# Patient Record
Sex: FEMALE | Race: WHITE
[De-identification: names, ages, dates, MRNs, and addresses within clinical notes are randomized per-mention and may not be internally consistent; named-entity substitution may affect disease eponyms.]

---

## 2018-05-30 ENCOUNTER — HOSPITAL ENCOUNTER (EMERGENCY)
Dept: HOSPITAL 74 - JER | Age: 68
Discharge: HOME | End: 2018-05-30
Payer: COMMERCIAL

## 2018-05-30 VITALS — TEMPERATURE: 98.3 F | HEART RATE: 89 BPM | SYSTOLIC BLOOD PRESSURE: 136 MMHG | DIASTOLIC BLOOD PRESSURE: 78 MMHG

## 2018-05-30 VITALS — BODY MASS INDEX: 36.6 KG/M2

## 2018-05-30 DIAGNOSIS — E11.65: ICD-10-CM

## 2018-05-30 DIAGNOSIS — Z79.4: ICD-10-CM

## 2018-05-30 DIAGNOSIS — I25.10: Primary | ICD-10-CM

## 2018-05-30 LAB
ALBUMIN SERPL-MCNC: 3 G/DL (ref 3.4–5)
ALP SERPL-CCNC: 124 U/L (ref 45–117)
ALT SERPL-CCNC: 38 U/L (ref 12–78)
ANION GAP SERPL CALC-SCNC: 7 MMOL/L (ref 8–16)
AST SERPL-CCNC: 24 U/L (ref 15–37)
BASOPHILS # BLD: 0.5 % (ref 0–2)
BILIRUB SERPL-MCNC: 0.7 MG/DL (ref 0.2–1)
BUN SERPL-MCNC: 33 MG/DL (ref 7–18)
CALCIUM SERPL-MCNC: 8.7 MG/DL (ref 8.5–10.1)
CHLORIDE SERPL-SCNC: 97 MMOL/L (ref 98–107)
CO2 SERPL-SCNC: 28 MMOL/L (ref 21–32)
CREAT SERPL-MCNC: 1.5 MG/DL (ref 0.55–1.02)
DEPRECATED RDW RBC AUTO: 13 % (ref 11.6–15.6)
EOSINOPHIL # BLD: 2.5 % (ref 0–4.5)
GLUCOSE SERPL-MCNC: 545 MG/DL (ref 74–106)
HCT VFR BLD CALC: 40.4 % (ref 32.4–45.2)
HGB BLD-MCNC: 13.5 GM/DL (ref 10.7–15.3)
INR BLD: 0.99 (ref 0.82–1.09)
LYMPHOCYTES # BLD: 32.9 % (ref 8–40)
MCH RBC QN AUTO: 31.8 PG (ref 25.7–33.7)
MCHC RBC AUTO-ENTMCNC: 33.5 G/DL (ref 32–36)
MCV RBC: 95 FL (ref 80–96)
MONOCYTES # BLD AUTO: 7.7 % (ref 3.8–10.2)
NEUTROPHILS # BLD: 56.4 % (ref 42.8–82.8)
PLATELET # BLD AUTO: 135 K/MM3 (ref 134–434)
PMV BLD: 11.1 FL (ref 7.5–11.1)
POTASSIUM SERPLBLD-SCNC: 4.5 MMOL/L (ref 3.5–5.1)
PROT SERPL-MCNC: 6.9 G/DL (ref 6.4–8.2)
PT PNL PPP: 11.2 SEC (ref 9.7–13)
RBC # BLD AUTO: 4.25 M/MM3 (ref 3.6–5.2)
SODIUM SERPL-SCNC: 132 MMOL/L (ref 136–145)
WBC # BLD AUTO: 4.8 K/MM3 (ref 4–10)

## 2018-05-30 PROCEDURE — 3E033VG INTRODUCTION OF INSULIN INTO PERIPHERAL VEIN, PERCUTANEOUS APPROACH: ICD-10-PCS

## 2018-05-30 PROCEDURE — 3E0337Z INTRODUCTION OF ELECTROLYTIC AND WATER BALANCE SUBSTANCE INTO PERIPHERAL VEIN, PERCUTANEOUS APPROACH: ICD-10-PCS

## 2018-05-30 NOTE — EKG
Test Reason : 

Blood Pressure : ***/*** mmHG

Vent. Rate : 067 BPM     Atrial Rate : 067 BPM

   P-R Int : 206 ms          QRS Dur : 092 ms

    QT Int : 430 ms       P-R-T Axes : 040 038 029 degrees

   QTc Int : 454 ms

 

NORMAL SINUS RHYTHM

NORMAL ECG

NO PREVIOUS ECGS AVAILABLE

Confirmed by LAUREN FORD, CRISTOPHER ([id number]) on 5/30/2018 1:22:43 PM

 

Referred By:             Confirmed By:CRISTOPHER AGUILAR MD

## 2018-05-30 NOTE — PDOC
History of Present Illness





- General


History Source: Patient


Exam Limitations: No Limitations





- History of Present Illness


Initial Comments: 





05/30/18 01:24


The patient is a 67 year old female with past medical history of IDDM and CAD s/

p stenting x2 BIBA for high blood sugar. The patient states she has been in [place] 

[place] now for two months and is getting  this week. She states she 

recently ran out of insulin, stating her finger stick today was in the 500s. 

She reports that when her sugar is high she gets a tightness in her chest and 

mild shortness of breath which she has presently. She also notes she has 

chronic leg swelling, left worse than right. She denies any palpitations, 

diaphoresis, cough, headache, or focal neurological deficits. She denies any 

recent illness, fevers, or chills. Denies any nausea, vomiting, diarrhea, or 

urinary complaints. 





<Sarah Thomas - Last Filed: 05/30/18 01:24>





<Noemi Baker - Last Filed: 06/04/18 02:27>





- General


Chief Complaint: Blood Sugar Problem


Stated Complaint: BLOOD SUGAR PROBLEM


Time Seen by Provider: 05/30/18 01:03





Past History





<Sarah Thomas - Last Filed: 05/30/18 01:24>





<Noemi Baker - Last Filed: 06/04/18 02:27>





- Past Medical History


Allergies/Adverse Reactions: 


 Allergies











Allergy/AdvReac Type Severity Reaction Status Date / Time


 


No Allergy Information Allergy   Verified 05/30/18 01:13





Available     











Home Medications: 


Ambulatory Orders





Allopurinol [Zyloprim -] 100 mg PO DAILY 05/30/18 


Bumetanide 1 mg PO DAILY 05/30/18 


Calcium Carb/Magnesium Hydrox [Antacid Chewable Tablet] 1 each PO DAILY 05/30/ 18 


Clopidogrel Bisulfate [Clopidogrel] 75 mg PO HS 05/30/18 


Gabapentin 800 mg PO BID 05/30/18 


Hydroxyzine HCl 25 mg PO HS 05/30/18 


Insulin Lispro [Humalog Kwikpen U-100] 1 unit SQ AC #10 [email or web address] 05/30/18 


Insulin NPH Hum/Reg Insulin Hm [Humulin 70/30 Kwikpen] 50 unit SQ BID 05/30/18 


Insulin NPH Hum/Reg Insulin Hm [Humulin 70/30 Kwikpen] 50 unit SQ BID #30 

[email or web address] 05/30/18 


Lactulose 10 gm PO TID 05/30/18 


Pramipexole Di-HCl [Mirapex] 0.5 mg PO BID 05/30/18 


Propranolol HCl 80 mg PO BID 05/30/18 


Spironolactone 25 mg PO DAILY 05/30/18 


Tramadol HCl 50 mg PO QID 05/30/18 











**Review of Systems





- Review of Systems


Able to Perform ROS?: Yes


Comments:: 





05/30/18 01:28


CONSTITUTIONAL: 


Absent: fever, chills, diaphoresis, generalized weakness, malaise, loss of 

appetite


HEENT: 


Absent: rhinorrhea, nasal congestion, throat pain, throat swelling, difficulty 

swallowing,


mouth swelling, ear pain, eye pain, visual Changes


CARDIOVASCULAR: (+) chest tightness, peripheral edema 


Absent: syncope, palpitations, irregular heart rate, lightheadedness,


RESPIRATORY: (+) shortness of breath 


Absent: cough, dyspnea with exertion, orthopnea, wheezing, stridor,


hemoptysis


GASTROINTESTINAL:


Absent: abdominal pain, abdominal distension, nausea, vomiting, diarrhea, 

constipation,


melena, hematochezia


GENITOURINARY: 


Absent: dysuria, frequency, urgency, hesitancy, hematuria, flank pain, genital 

pain


MUSCULOSKELETAL: 


Absent: myalgia, arthralgia, joint swelling


SKIN:


Absent: rash, itching, pallor


HEMATOLOGIC/IMMUNOLOGIC: 


Absent: easy bleeding, easy bruising, lymphadenopathy, frequent infections


ENDOCRINE:


Absent: unexplained weight gain, unexplained weight loss, heat intolerance, 

cold intolerance


NEUROLOGIC: 


Absent: headache, focal weakness or paresthesias, dizziness, unsteady gait, 

seizure, mental


status changes, bladder or bowel incontinence


PSYCHIATRIC: 


Absent: anxiety, depression, suicidal or homicidal ideation, hallucinations.





All Other Systems: Reviewed and Negative





<Sarah Thomas - Last Filed: 05/30/18 01:24>





*Physical Exam





- Vital Signs


 Last Vital Signs











Temp Pulse Resp BP Pulse Ox


 


 98.2 F   98 H     144/72    


 


 05/30/18 00:40  05/30/18 00:40     05/30/18 00:40   














- Physical Exam


Comments: 





05/30/18 01:31


GENERAL:


Well developed, well nourished. Awake and alert. No acute distress.


HEENT:


Normocephalic, atraumatic. PERRLA, EOMI. No conjunctival pallor. Sclera are non-

icteric.


Moist mucous membranes. Oropharynx is clear.


NECK: 


Supple. Full ROM. No JVD. Carotid pulses 2+ and symmetric, without bruits. No


thyromegaly. No lymphadenopathy.


CARDIOVASCULAR:


Regular rate and rhythm. No murmurs, rubs, or gallops. Distal pulses are 2+ and


symmetric. 


PULMONARY: 


No evidence of respiratory distress. Lungs clear to auscultation bilaterally. 

No wheezing,


rales or rhonchi.


ABDOMINAL:


Soft. Non-tender. Protuberant. No rebound or guarding. No organomegaly. 

Normoactive


bowel sounds. 


MUSCULOSKELETAL 


Normal range of motion at all joints. No bony deformities or tenderness. No CVA


tenderness.


EXTREMITIES: 


Chronic venous stasis on bilateral lower extremities, left worse than right. No 

cyanosis. No clubbing.No calf tenderness.


SKIN: 


Warm and dry. Normal capillary refill. No rashes. No jaundice. 


NEUROLOGICAL: 


Alert, awake, appropriate. Cranial nerves 2-12 intact. Gait is normal without 

ataxia.


PSYCHIATRIC: 


Cooperative. Good eye contact. Appropriate mood and affect.











<Sarah Thomas - Last Filed: 05/30/18 01:24>





ED Treatment Course





- LABORATORY


CBC & Chemistry Diagram: 


 05/30/18 01:52





 05/30/18 01:52





<Noemi Baker - Last Filed: 06/04/18 02:27>





Medical Decision Making





- Medical Decision Making





05/30/18 01:14


67-year-old female ambulance because she's had elevated blood sugars for the 

past 2 months   She also states that she's had intermittent shortness of breath 

and some intermittent chest pain  over the past month.  She does have a history 

of coronary artery disease and is status post 2 stents.  She takes Plavix 

daily.  She does have a chronically swollen left leg and stated that she had a 

negative duplex Doppler in the  past





And currently is calm, relaxed speaking in full sentences.


She is not tachycardic or hypoxic.  Lungs are clear to auscultation and the and 

rebound.  There is no gallops, murmurs or rubs, neck is no bruits no JVD.


Chronic venous stasis changes in her legs and her left leg is larger than her 

right





She states that she is from [place] and left 2 months ago.  She said that she 

has a new physician but hasn't been able to really follow up with her today.  

She ran out of her insolent and a day or 2 ago.  She is an insulin-dependent 

diabetic for the past 30 years.





Patient moved up from [place] 2 months ago to be [facility] and plans to get 

 next week





plan-EKG,.,  CBC, coags, cardiac chest x-ray





<Noemi Baker - Last Filed: 06/04/18 02:27>





*DC/Admit/Observation/Transfer





- Attestations


Scribe Attestion: 





05/30/18 01:33


Documentation prepared by Sarah Thomas, acting as medical scribe for Noemi Baker MD.





<Sarah Thomas - Last Filed: 05/30/18 01:24>





<Noemi Baker - Last Filed: 06/04/18 02:27>


Diagnosis at time of Disposition: 


 Hyperglycemia








- Discharge Dispostion


Disposition: HOME


Condition at time of disposition: Stable





- Prescriptions


Prescriptions: 


Insulin Lispro [Humalog Kwikpen U-100] 1 unit SQ AC #10 [email or web address]


Insulin NPH Hum/Reg Insulin Hm [Humulin 70/30 Kwikpen] 50 unit SQ BID #30 

[email or web address]

## 2018-05-30 NOTE — PDOC
*Physical Exam





- Vital Signs


 Last Vital Signs











Temp Pulse Resp BP Pulse Ox


 


 98.2 F   98 H     144/72    


 


 05/30/18 00:40  05/30/18 00:40     05/30/18 00:40   














ED Treatment Course





- LABORATORY


CBC & Chemistry Diagram: 


 05/30/18 01:52





 05/30/18 01:52





- ADDITIONAL ORDERS


Additional order review: 


 Laboratory  Results











  05/30/18 05/30/18





  01:52 01:52


 


PT with INR   11.20


 


INR   0.99


 


Sodium  132 L 


 


Potassium  4.5 


 


Chloride  97 L 


 


Carbon Dioxide  28 


 


Anion Gap  7 L 


 


BUN  33 H 


 


Creatinine  1.5 H 


 


Creat Clearance w eGFR  34.64 


 


Random Glucose  545 H* 


 


Calcium  8.7 


 


Total Bilirubin  0.7 


 


AST  24 


 


ALT  38 


 


Alkaline Phosphatase  124 H 


 


Creatine Kinase  108 


 


Troponin I  < 0.02 


 


Total Protein  6.9 


 


Albumin  3.0 L 








 











  05/30/18





  01:52


 


RBC  4.25


 


MCV  95.0


 


MCHC  33.5


 


RDW  13.0


 


MPV  11.1


 


Neutrophils %  56.4


 


Lymphocytes %  32.9


 


Monocytes %  7.7


 


Eosinophils %  2.5


 


Basophils %  0.5














- Medications


Given in the ED: 


ED Medications














Discontinued Medications














Generic Name Dose Route Start Last Admin





  Trade Name Freq  PRN Reason Stop Dose Admin


 


Sodium Chloride  1,000 mls @ 1,000 mls/hr  05/30/18 01:26  05/30/18 02:02





  Normal Saline -  IV  05/30/18 02:25  1,000 mls/hr





  ASDIR STA   Administration





     





     





     





     














Medical Decision Making





- Medical Decision Making





05/30/18 03:11


I received gladis Dalal on sign out


She presented to the ER due to elevated blood glucose


CMP demonstrates glucose of >500


Pt states she takes NPH 0/30 50 units BID with blood glucose levels of 300s








 Laboratory Tests











  05/30/18





  01:52


 


Sodium  132 L


 


Potassium  4.5


 


Chloride  97 L


 


Carbon Dioxide  28


 


Anion Gap  7 L


 


BUN  33 H


 


Creatinine  1.5 H


 


Random Glucose  545 H*


 


Creatine Kinase  108


 


Troponin I  < 0.02











05/30/18 03:12


Will given Regular insulin 12 units


Will given an additional Liter of IV Fluids


Will repeat troponin





05/30/18 06:08


 Laboratory Tests











  05/30/18





  05:27


 


POC Glucometer  [phone]








This is approximately where he blood glucose typically runs


Pending repeat treop


Annticipate discharge





Clinical Impression: hyperglycemia, initial presentation


medication non compliance, initial presentation


05/30/18 06:28


 Laboratory Tests











  05/30/18





  05:30


 


Creatine Kinase  83


 


Troponin I  < 0.02














*DC/Admit/Observation/Transfer


Diagnosis at time of Disposition: 


 Hyperglycemia








- Discharge Dispostion


Disposition: HOME


Condition at time of disposition: Stable


Decision to Admit order: No





- Prescriptions


Prescriptions: 


Insulin Lispro [Humalog Kwikpen U-100] 1 unit SQ AC #10 [email or web address]


Insulin NPH Hum/Reg Insulin Hm [Humulin 70/30 Kwikpen] 50 unit SQ BID #30 

[email or web address]





- Referrals





- Patient Instructions





- Post Discharge Activity